# Patient Record
Sex: MALE | Race: BLACK OR AFRICAN AMERICAN | Employment: FULL TIME | URBAN - METROPOLITAN AREA
[De-identification: names, ages, dates, MRNs, and addresses within clinical notes are randomized per-mention and may not be internally consistent; named-entity substitution may affect disease eponyms.]

---

## 2022-12-14 ENCOUNTER — OFFICE VISIT (OUTPATIENT)
Dept: FAMILY MEDICINE CLINIC | Age: 37
End: 2022-12-14

## 2022-12-14 ENCOUNTER — HOSPITAL ENCOUNTER (OUTPATIENT)
Dept: LAB | Age: 37
Discharge: HOME | End: 2022-12-14

## 2022-12-14 ENCOUNTER — TELEPHONE (OUTPATIENT)
Dept: FAMILY MEDICINE CLINIC | Age: 37
End: 2022-12-14

## 2022-12-14 VITALS
TEMPERATURE: 98.1 F | DIASTOLIC BLOOD PRESSURE: 88 MMHG | HEART RATE: 66 BPM | SYSTOLIC BLOOD PRESSURE: 129 MMHG | WEIGHT: 204.6 LBS | OXYGEN SATURATION: 100 % | RESPIRATION RATE: 17 BRPM | HEIGHT: 71 IN | BODY MASS INDEX: 28.64 KG/M2

## 2022-12-14 DIAGNOSIS — M79.2 RADICULAR PAIN IN RIGHT ARM: ICD-10-CM

## 2022-12-14 DIAGNOSIS — M79.2 RADICULAR PAIN IN LEFT ARM: ICD-10-CM

## 2022-12-14 DIAGNOSIS — G62.9 POLYNEUROPATHY: ICD-10-CM

## 2022-12-14 DIAGNOSIS — Z11.59 NEED FOR HEPATITIS C SCREENING TEST: ICD-10-CM

## 2022-12-14 DIAGNOSIS — Z00.00 ENCOUNTER FOR ROUTINE HISTORY AND PHYSICAL EXAM FOR MALE: Primary | ICD-10-CM

## 2022-12-14 DIAGNOSIS — M25.50 CHRONIC PAIN OF MULTIPLE JOINTS: ICD-10-CM

## 2022-12-14 DIAGNOSIS — G89.29 CHRONIC PAIN OF MULTIPLE JOINTS: ICD-10-CM

## 2022-12-14 DIAGNOSIS — Z23 ENCOUNTER FOR IMMUNIZATION: ICD-10-CM

## 2022-12-14 DIAGNOSIS — Z00.00 ENCOUNTER FOR ROUTINE HISTORY AND PHYSICAL EXAM FOR MALE: ICD-10-CM

## 2022-12-14 LAB
ALBUMIN SERPL-MCNC: 4.5 G/DL (ref 3.4–5)
ALBUMIN/GLOB SERPL: 1.1 {RATIO} (ref 0.8–1.7)
ALP SERPL-CCNC: 68 U/L (ref 45–117)
ALT SERPL-CCNC: 41 U/L (ref 16–61)
ANION GAP SERPL CALC-SCNC: 5 MMOL/L (ref 3–18)
APPEARANCE UR: CLEAR
AST SERPL-CCNC: 30 U/L (ref 10–38)
BACTERIA URNS QL MICRO: ABNORMAL /HPF
BASOPHILS # BLD: 0.1 K/UL (ref 0–0.1)
BASOPHILS NFR BLD: 1 % (ref 0–2)
BILIRUB SERPL-MCNC: 0.8 MG/DL (ref 0.2–1)
BILIRUB UR QL: NEGATIVE
BUN SERPL-MCNC: 13 MG/DL (ref 7–18)
BUN/CREAT SERPL: 13 (ref 12–20)
CALCIUM SERPL-MCNC: 10.1 MG/DL (ref 8.5–10.1)
CHLORIDE SERPL-SCNC: 107 MMOL/L (ref 100–111)
CHOLEST SERPL-MCNC: 187 MG/DL
CO2 SERPL-SCNC: 27 MMOL/L (ref 21–32)
COLOR UR: ABNORMAL
CREAT SERPL-MCNC: 1 MG/DL (ref 0.6–1.3)
DIFFERENTIAL METHOD BLD: NORMAL
EOSINOPHIL # BLD: 0.1 K/UL (ref 0–0.4)
EOSINOPHIL NFR BLD: 2 % (ref 0–5)
EPITH CASTS URNS QL MICRO: ABNORMAL /LPF (ref 0–5)
ERYTHROCYTE [DISTWIDTH] IN BLOOD BY AUTOMATED COUNT: 12.9 % (ref 11.6–14.5)
EST. AVERAGE GLUCOSE BLD GHB EST-MCNC: 117 MG/DL
GLOBULIN SER CALC-MCNC: 4.1 G/DL (ref 2–4)
GLUCOSE SERPL-MCNC: 84 MG/DL (ref 74–99)
GLUCOSE UR STRIP.AUTO-MCNC: NEGATIVE MG/DL
HBA1C MFR BLD: 5.7 % (ref 4.2–5.6)
HCT VFR BLD AUTO: 44.6 % (ref 36–48)
HDLC SERPL-MCNC: 60 MG/DL (ref 40–60)
HDLC SERPL: 3.1 {RATIO} (ref 0–5)
HGB BLD-MCNC: 14.7 G/DL (ref 13–16)
HGB UR QL STRIP: NEGATIVE
IMM GRANULOCYTES # BLD AUTO: 0 K/UL (ref 0–0.04)
IMM GRANULOCYTES NFR BLD AUTO: 0 % (ref 0–0.5)
KETONES UR QL STRIP.AUTO: ABNORMAL MG/DL
LDLC SERPL CALC-MCNC: 118.2 MG/DL (ref 0–100)
LEUKOCYTE ESTERASE UR QL STRIP.AUTO: NEGATIVE
LIPID PROFILE,FLP: ABNORMAL
LYMPHOCYTES # BLD: 1.8 K/UL (ref 0.9–3.6)
LYMPHOCYTES NFR BLD: 32 % (ref 21–52)
MCH RBC QN AUTO: 31.4 PG (ref 24–34)
MCHC RBC AUTO-ENTMCNC: 33 G/DL (ref 31–37)
MCV RBC AUTO: 95.3 FL (ref 78–100)
MONOCYTES # BLD: 0.4 K/UL (ref 0.05–1.2)
MONOCYTES NFR BLD: 8 % (ref 3–10)
NEUTS SEG # BLD: 3.1 K/UL (ref 1.8–8)
NEUTS SEG NFR BLD: 56 % (ref 40–73)
NITRITE UR QL STRIP.AUTO: NEGATIVE
NRBC # BLD: 0 K/UL (ref 0–0.01)
NRBC BLD-RTO: 0 PER 100 WBC
PH UR STRIP: 7.5 [PH] (ref 5–8)
PLATELET # BLD AUTO: 281 K/UL (ref 135–420)
PMV BLD AUTO: 11.5 FL (ref 9.2–11.8)
POTASSIUM SERPL-SCNC: 4.5 MMOL/L (ref 3.5–5.5)
PROT SERPL-MCNC: 8.6 G/DL (ref 6.4–8.2)
PROT UR STRIP-MCNC: ABNORMAL MG/DL
RBC # BLD AUTO: 4.68 M/UL (ref 4.35–5.65)
RBC #/AREA URNS HPF: ABNORMAL /HPF (ref 0–5)
SODIUM SERPL-SCNC: 139 MMOL/L (ref 136–145)
SP GR UR REFRACTOMETRY: 1.03 (ref 1–1.03)
TRIGL SERPL-MCNC: 44 MG/DL (ref ?–150)
TSH SERPL DL<=0.05 MIU/L-ACNC: 1.57 UIU/ML (ref 0.36–3.74)
UROBILINOGEN UR QL STRIP.AUTO: 1 EU/DL (ref 0.2–1)
VLDLC SERPL CALC-MCNC: 8.8 MG/DL
WBC # BLD AUTO: 5.5 K/UL (ref 4.6–13.2)
WBC URNS QL MICRO: NEGATIVE /HPF (ref 0–4)

## 2022-12-14 PROCEDURE — 36415 COLL VENOUS BLD VENIPUNCTURE: CPT

## 2022-12-14 PROCEDURE — 85025 COMPLETE CBC W/AUTO DIFF WBC: CPT

## 2022-12-14 PROCEDURE — 81001 URINALYSIS AUTO W/SCOPE: CPT

## 2022-12-14 PROCEDURE — 84443 ASSAY THYROID STIM HORMONE: CPT

## 2022-12-14 PROCEDURE — 80061 LIPID PANEL: CPT

## 2022-12-14 PROCEDURE — 99385 PREV VISIT NEW AGE 18-39: CPT | Performed by: NURSE PRACTITIONER

## 2022-12-14 PROCEDURE — 83036 HEMOGLOBIN GLYCOSYLATED A1C: CPT

## 2022-12-14 PROCEDURE — 80053 COMPREHEN METABOLIC PANEL: CPT
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
## 2022-12-16 DIAGNOSIS — E78.00 ELEVATED LDL CHOLESTEROL LEVEL: ICD-10-CM

## 2022-12-16 DIAGNOSIS — R73.03 PREDIABETES: Primary | ICD-10-CM

## 2022-12-16 NOTE — PROGRESS NOTES
Labs show A1C of 5.7 with trace ketones and protein in the urine- this is prediabetes. Recommended at this time diet and exercise management- limit simple carbohydrates, sugary products and drinks, limit etoh intake, limit potatoes, pastas. Water!!!!    Cholesterol shows LDL at 118, HDL at 60 so at this time recommended mediterrean diet, limit meat, lean cuts only. High fiber in the diet    Thyroid is good, no infectious or anemic processes noted. Lets follow up in 6 months to repeat labs for prediabetes and cholesterol. Please schedule with the office for follow up with me and labs to be compeleted 1 week prior fasting!

## 2022-12-21 ENCOUNTER — TELEPHONE (OUTPATIENT)
Dept: FAMILY MEDICINE CLINIC | Age: 37
End: 2022-12-21

## 2022-12-21 NOTE — TELEPHONE ENCOUNTER
----- Message from Melisa Mcgregor NP sent at 12/16/2022  8:27 AM EST -----  Labs show A1C of 5.7 with trace ketones and protein in the urine- this is prediabetes. Recommended at this time diet and exercise management- limit simple carbohydrates, sugary products and drinks, limit etoh intake, limit potatoes, pastas. Water!!!!    Cholesterol shows LDL at 118, HDL at 60 so at this time recommended mediterrean diet, limit meat, lean cuts only. High fiber in the diet    Thyroid is good, no infectious or anemic processes noted. Lets follow up in 6 months to repeat labs for prediabetes and cholesterol. Please schedule with the office for follow up with me and labs to be compeleted 1 week prior fasting! 18-Aug-2018 09:29

## 2022-12-21 NOTE — TELEPHONE ENCOUNTER
Spoke w patient regarding lab results. Verbalized a understanding. Also, informed him to give us a call in January for labs.

## 2022-12-21 NOTE — TELEPHONE ENCOUNTER
Reached out to go over lab results. There was no answer. Left vm to give our office a call.      Bobby Marcano

## 2023-02-02 ENCOUNTER — OFFICE VISIT (OUTPATIENT)
Dept: ORTHOPEDIC SURGERY | Age: 38
End: 2023-02-02

## 2023-02-02 VITALS
HEIGHT: 68 IN | OXYGEN SATURATION: 95 % | HEART RATE: 87 BPM | TEMPERATURE: 98.2 F | WEIGHT: 192 LBS | BODY MASS INDEX: 29.1 KG/M2

## 2023-02-02 DIAGNOSIS — M47.816 LUMBAR SPONDYLOSIS: ICD-10-CM

## 2023-02-02 DIAGNOSIS — M54.50 LUMBAR PAIN: ICD-10-CM

## 2023-02-02 DIAGNOSIS — M47.816 FACET ARTHROPATHY, LUMBAR: ICD-10-CM

## 2023-02-02 DIAGNOSIS — M79.672 LEFT FOOT PAIN: Primary | ICD-10-CM

## 2023-02-02 DIAGNOSIS — M46.1 SACROILIITIS (HCC): ICD-10-CM

## 2023-02-02 RX ORDER — DICLOFENAC SODIUM 75 MG/1
75 TABLET, DELAYED RELEASE ORAL 2 TIMES DAILY WITH MEALS
Qty: 60 TABLET | Refills: 1 | Status: SHIPPED | OUTPATIENT
Start: 2023-02-02

## 2023-02-02 NOTE — PROGRESS NOTES
Patient: Deanna Mayberry                MRN: [id number]       SSN: [id number]  YOB: 1985        AGE: 44 y.o. SEX: male          PCP: Lennie Edward NP  02/02/23    Chief Complaint   Patient presents with    Back Pain     Lower back         HISTORY:  Deanna Mayberry is a 44 y.o. male seen for Chronic lumbar pain dating over 10 years with no bowel, nor bladder in continence, no radicular pain LE. No injuries, but playing sports in high school   patient feels he may have injured but no treatment obtained. Pain on and off with right low back and no buttocks pain. Tyl and OTC meds are helpful when the condition is occurring. Patient reports often he will experience this type of lower back pain twice a month with resolution taking from 1 to a couple days. Lab Results   Component Value Date/Time    Hemoglobin A1c 5.7 (H) 12/14/2022 11:11 AM     Weight Metrics 2/2/2023 12/14/2022 12/26/2018 12/5/2018 10/3/2016   Weight 192 lb 204 lb 9.6 oz 199 lb 199 lb 178 lb   BMI 29.19 kg/m2 28.54 kg/m2 27.75 kg/m2 27.75 kg/m2 24.83 kg/m2            Problem List Items Addressed This Visit    None  Visit Diagnoses       Left foot pain    -  Primary    Relevant Orders    REFERRAL TO ORTHOPEDIC SURGERY    Lumbar pain        Relevant Medications    diclofenac EC (VOLTAREN) 75 mg EC tablet    Other Relevant Orders    AMB POC XRAY, SPINE, LUMBOSACRAL; 4+ (Completed)    REFERRAL TO PHYSICAL THERAPY    REFERRAL TO ORTHOPEDIC SURGERY    Sacroiliitis (HCC)        Relevant Orders    REFERRAL TO PHYSICAL THERAPY    REFERRAL TO ORTHOPEDIC SURGERY    Lumbar spondylosis        Relevant Medications    diclofenac EC (VOLTAREN) 75 mg EC tablet    Facet arthropathy, lumbar        Relevant Medications    diclofenac EC (VOLTAREN) 75 mg EC tablet            PAST MEDICAL HISTORY: History reviewed. No pertinent past medical history.     PAST SURGICAL HISTORY:   Past Surgical History:   Procedure Laterality Date    HX UROLOGICAL  12/18/2018    CIRCUMCISION, ADULT- Dr. Fe Rubio: No Known Allergies     CURRENT MEDICATIONS:  A list of medications prior to the time of admission include:  Prior to Admission medications    Medication Sig Start Date End Date Taking? Authorizing Provider   diclofenac EC (VOLTAREN) 75 mg EC tablet Take 1 Tablet by mouth two (2) times daily (with meals). 2/2/23  Yes Jose Torrez PA-C   cyanocobalamin, vitamin B-12, (VITAMIN B12 PO) Take  by mouth daily. Provider, Historical       FAMILY HISTORY:   Family History   Problem Relation Age of Onset    Stroke Mother     Alcohol abuse Mother         COD    No Known Problems Father     No Known Problems Sister        SOCIAL HISTORY:   Social History     Socioeconomic History    Marital status: SINGLE   Tobacco Use    Smoking status: Never    Smokeless tobacco: Never   Vaping Use    Vaping Use: Never used   Substance and Sexual Activity    Alcohol use: Yes     Comment: occasionally    Drug use: No    Sexual activity: Yes     Partners: Female     Social Determinants of Health     Financial Resource Strain: Low Risk     Difficulty of Paying Living Expenses: Not hard at all   Food Insecurity: No Food Insecurity    Worried About Running Out of Food in the Last Year: Never true    Ran Out of Food in the Last Year: Never true       ROS:No CP, No SOB, No fever/chills nor night sweats. No headaches, vision abnormalities to include double and or loss of vision. No dizziness. No hearing abnormalities. No Chest Pain nor Shortness of breath. Pt denies h/o spinal surgery, injections, or PT/chiropractor. Patient has attempted self treatment with less than adequate relief on oral and topical analgesic / anti inflammatory medications . Pt denies change in bowel or bladder habits. No saddle paresthesia / anesthesia.  Pt denies fever, unplanned weight loss / weight gains, and no skin changes. Musculoskeletal pain per HPI. Pain is exacerbated positionally. PHYSICAL EXAM:    Visit Vitals  Pulse 87   Temp 98.2 °F (36.8 °C) (Temporal)   Ht 5' 8\" (1.727 m)   Wt 192 lb (87.1 kg)   SpO2 95%   BMI 29.19 kg/m²       Constitutional: Appears well-developed and well-nourished. No distress. Sitting comfortably in the exam room, interacting with conversation with pleasant affect. Gait appears steady and patient exhibits no evidence of ataxia. Patient is able to ambulate with caution. No focal neurological deficit noted. No facial droop, slurred speech, or evidence of altered mentation noted on exam.   Skin: Skin over the head, neck, bilateral limbs, and trunk is warm and dry. No rash or erythema noted. Cranial Nerves II-XII grossly intact  HENT: NC/AT. Normal symmetry, bulk and tone of facial and neck musculature. Trachea midline. No discernible thyromegaly or masses. No involuntary movements. Lymphatic: No preauricular, submandibuar, anterior or posterior cervical lymphadenopathy. Psychiatric: The patient is awake, alert, and oriented to person, place and time. Behavior is normal. Thought content normal.   Cardiovascular: No clubbing, cyanosis. No edema bilateral lower extremities. Pulmonary: No tripoding nor accessory muscle recruitment. Breathing normally, no distress, no audible wheezing. Distal cap refill intact at 2/2 Damon UE / LE. Neuro intact Damon UE/LE to noxious stimuli        Ortho Specific exam:      Patient in shorts examined supine on the bed noted to have a negative straight leg raise symmetrically and a negative contralateral figure 4 sign also symmetrically. Sitting at bedside both knees flexed at 90 degrees motor strength noted quad hamstrings abductors abductors 5/5 against resistance. DTRs tested both knees flexed at 90 degrees 2+/2 patella and 2/2 for Achilles symmetrically. Negative Babinski symmetrically.     Patient standing at bedside both feet together right hand resting on the table patient forward flexed at the waist touching fingertips to just below the knees without pain or guarding. Returning to a neutral standing position patient has no pain or guarding. Again standing neutral flexing to 20 degrees midline lumbar spine examined to reveal no step-off lesions or masses palpable. Left para lumbar musculature nontender with no cords or spasms noted. Right paralumbar musculature reveals a lipomatous mass associated with the SI joint and on deep palpation SI discomfort. Soft tissue lipomatous mass mobile by half centimeter in all directions. Generally though the mass was nontender. Hyperextension of the back at 10 degrees without pain or guarding. Lateral bending to the left and the right both noted at 15 to 20 degrees respectively without pain or guarding. Lateral rotation to the left 10 degrees to the right 50 degrees without pain or guarding. [facility] lateral lumbar spine reveals early degenerative changes noted L4-L5 L5-S1. There is also facet arthropathy early L4-L5 L5-S1. No evidence of spondylolisthesis. No fracture deformities. No evidence of lytic or blastic lesions. No soft tissue ossifications. IMPRESSION:      ICD-10-CM ICD-9-CM    1. Left foot pain  M79.672 729.5 REFERRAL TO ORTHOPEDIC SURGERY      2. Lumbar pain  M54.50 724.2 AMB POC XRAY, SPINE, LUMBOSACRAL; 4+      REFERRAL TO PHYSICAL THERAPY      REFERRAL TO ORTHOPEDIC SURGERY      3. Sacroiliitis (HCC)  M46.1 720.2 REFERRAL TO PHYSICAL THERAPY      REFERRAL TO ORTHOPEDIC SURGERY      4. Lumbar spondylosis  M47.816 721.3       5. Facet arthropathy, lumbar  M47.816 721.3       6. Obesity BMI 29.19    PLAN: Today we discussed options for care to include but not limited to a brief course of physical therapy to demonstrate for home exercises 2 times with HEP.   Also as anti-inflammatory for symptoms when necessary I recommended Voltaren enteric-coated tablet 75 mg twice daily. Prescription was called to the pharmacy on file. Patient to follow-up with us on a as needed basis. Should he develop any bowel or bladder incontinence saddle paresthesia or anesthesia or have any change to worsening of his symptoms he is to call this office immediately for reevaluation if after hours proceed to the emergency room. Consideration for MRI of the lumbar spine if those symptoms as mentioned previous occur. Today all of his questions answered to his satisfaction. Additionally today we discussed the diagnosis of obesity and the importance of weight management for both cardiovascular health. Weight loss strategies discussed below. Patient recommended decreasing carbohydrates, sugars and calories for weight control to improve cardiovascular health and decrease axial loading forces on hips knees and ankles. Patient recommended a formal dietary consult which they will consider and return a call to our office. In light of the patient's osteoarthritic findings I am making a recommendation for aerobic exercise to include but not limited to stationary bicycle, elliptical, therapeutic walking with good shoes and or swimming. Patient should avoid any running or jumping. If using the treadmill then recommendation for no elevation and no running or jogging. Will discussed supportive treatment, NSAIDS, RICE and orthopedic follow-up. Discussed treatment plan, return precautions, symptomatic relief, and expected time to improvement. All questions answered. Patient is stable for discharge and outpatient management. Medication use, risk/benefit, side effects, and precautions discussed. Care plan outlined and precautions discussed. Results were reviewed with the patient. All medications were reviewed with the patient. All of pt's questions and concerns were addressed.   Alarm symptoms and return precautions associated with chief complaint and evaluation were reviewed with the patient in detail. The patient demonstrated adequate understanding. The patient expresses willing compliance with the treatment plan. Special note: Medication management discussed in detail all patient's questions answered to their satisfaction. Patient provided a reminder for a \"due or due soon\" health maintenance. I have asked the patient to schedule an appointment with their primary care provider for follow-up on general health maintenance concerns. Today all the patient's questions were answered to their satisfaction. Copies of x-rays reviewed if obtained this visit, and provided to patient. Dictation disclaimer:  Please note that this dictation was completed with Motivano, the computer voice recognition software. Quite often unanticipated grammatical, syntax, homophones, and other interpretive errors are inadvertently transcribed by the computer software. Please disregard these errors. Please excuse any errors that have escaped final proofreading. Demi DONOHUE, APC, MPAS, PA-C  [facility]

## 2023-02-04 DIAGNOSIS — E78.00 ELEVATED LDL CHOLESTEROL LEVEL: ICD-10-CM

## 2023-02-04 DIAGNOSIS — R73.03 PREDIABETES: Primary | ICD-10-CM

## 2023-02-05 DIAGNOSIS — E78.00 ELEVATED LDL CHOLESTEROL LEVEL: ICD-10-CM

## 2023-02-05 DIAGNOSIS — R73.03 PREDIABETES: Primary | ICD-10-CM

## 2023-02-09 DIAGNOSIS — E78.00 ELEVATED LDL CHOLESTEROL LEVEL: ICD-10-CM

## 2023-02-09 DIAGNOSIS — R73.03 PREDIABETES: Primary | ICD-10-CM

## 2023-02-14 DIAGNOSIS — M79.672 LEFT FOOT PAIN: ICD-10-CM

## 2023-02-14 DIAGNOSIS — M54.50 LUMBAR PAIN: Primary | ICD-10-CM

## 2023-02-14 DIAGNOSIS — M46.1 SACROILIITIS (HCC): ICD-10-CM

## 2023-12-15 ENCOUNTER — OFFICE VISIT (OUTPATIENT)
Age: 38
End: 2023-12-15

## 2023-12-15 VITALS
TEMPERATURE: 100.6 F | BODY MASS INDEX: 32.55 KG/M2 | HEART RATE: 54 BPM | SYSTOLIC BLOOD PRESSURE: 134 MMHG | WEIGHT: 214.8 LBS | DIASTOLIC BLOOD PRESSURE: 91 MMHG | HEIGHT: 68 IN | RESPIRATION RATE: 16 BRPM | OXYGEN SATURATION: 97 %

## 2023-12-15 DIAGNOSIS — E78.00 ELEVATED LDL CHOLESTEROL LEVEL: ICD-10-CM

## 2023-12-15 DIAGNOSIS — Z00.00 ANNUAL PHYSICAL EXAM: ICD-10-CM

## 2023-12-15 DIAGNOSIS — B07.0 PLANTAR WART: ICD-10-CM

## 2023-12-15 DIAGNOSIS — Z11.59 ENCOUNTER FOR HEPATITIS C SCREENING TEST FOR LOW RISK PATIENT: Primary | ICD-10-CM

## 2023-12-15 DIAGNOSIS — Z11.3 SCREENING EXAMINATION FOR STD (SEXUALLY TRANSMITTED DISEASE): ICD-10-CM

## 2023-12-15 DIAGNOSIS — R73.03 PREDIABETES: ICD-10-CM

## 2023-12-15 DIAGNOSIS — Z23 IMMUNIZATION DUE: ICD-10-CM

## 2023-12-15 PROCEDURE — 90715 TDAP VACCINE 7 YRS/> IM: CPT | Performed by: NURSE PRACTITIONER

## 2023-12-15 PROCEDURE — PBSHW TDAP, BOOSTRIX, (AGE 10 YRS+), IM: Performed by: NURSE PRACTITIONER

## 2023-12-15 PROCEDURE — 99395 PREV VISIT EST AGE 18-39: CPT | Performed by: NURSE PRACTITIONER

## 2023-12-15 NOTE — PATIENT INSTRUCTIONS
of    Website: [email or web address].info/     [address] ([facility])  What they offer: Provide support to [place] residents who are experiencing crises by assessing their needs, providing information and resources, and directly contributing financially  Website: [place]  [place] residents can make an appointment by calling [facility] at [phone] or emailing [email or web address]        Medication  Good Rx   What they offer: Good Rx tracks prescription drug prices and provides free drug coupons for discounts on medications. Website: VipAnalysisMedAlliance/     NeedyMeds   What they offer: Needy[employer]eds offers free information on medications and healthcare cost savings programs including prescription assistance programs, coupons, and discount programs. Website: PaymentBack.Salezeo org/   Helpline: 715.967.5151     RX Assist   What they offer: Information about free and low-cost medicine programs. Website: https://Mapidy/     [facility] $4 Prescription Program   What they offer: Prescription Program includes up to a 30-day supply for $4 and a 90-day supply for $10 of some covered generic drugs at commonly prescribed dosages   Website: Dial2-Observevinay.de      The Co-Pay Relief Program  What they offer: The Co-Pay Relief Program provides you with direct prescription co-payment assistance, if you are an insured [place] citizen and financially and medically qualify, including Medicare Part D beneficiaries who require assistance with their prescription drug co-payments. Phone toll-free: 5-809.642.2485  Website: www.Soluto. org    The Partnership for Prescription Assistance   What they offer: The Partnership for Prescription Assistance can help you if you lack Prescription coverage to get the medicines you need through the public or private program that is right for you.   Phone toll-free:

## 2023-12-15 NOTE — PROGRESS NOTES
Azeb Stanley (:  1985) is a 43 y.o. male, here for evaluation of the following medical concerns:  Chief Complaint   Patient presents with    Annual Exam         ASSESSMENT/PLAN:  1. Encounter for hepatitis C screening test for low risk patient    - Hepatitis C Antibody; Future    2. Annual physical exam    - CBC with Auto Differential; Future  - Comprehensive Metabolic Panel; Future  - Hemoglobin A1C; Future  - Lipid Panel; Future  - Urinalysis with Microscopic; Future  - Hepatitis C Antibody; Future  - Hepatitis Be antigen; Future  - HIV 1/2 Ag/Ab, 4TH Generation,W Rflx Confirm; Future  - T. pallidum Ab; Future  - Chlamydia, Gonorrhea, Trichomoniasis; Future    3. Immunization due    - Tdap, BOOSTRIX, (age 8 yrs+), IM    4. Screening examination for STD (sexually transmitted disease)    - Hepatitis Be antigen; Future  - HIV 1/2 Ag/Ab, 4TH Generation,W Rflx Confirm; Future  - T. pallidum Ab; Future  - Chlamydia, Gonorrhea, Trichomoniasis; Future    5. Prediabetes    - Comprehensive Metabolic Panel; Future  - Hemoglobin A1C; Future    6. Elevated LDL cholesterol level    - Comprehensive Metabolic Panel; Future  - Hemoglobin A1C; Future  - Lipid Panel; Future    7. Plantar wart    - External Referral To Dermatology      Return for [id number], ANNUAL PHYSICAL. HPI  Here for routine physical. Needs tdap, declines influenza. He is a . Had a baby he is 5 months its a boy, Monster. Has plantar wart- completed freeze in the past, he repeated freeze this time and no resolve. Review of Systems   Constitutional:  Negative for diaphoresis and fatigue. Respiratory:  Negative for cough, chest tightness and shortness of breath. Cardiovascular:  Negative for chest pain, palpitations and leg swelling. Gastrointestinal:  Negative for abdominal pain, nausea and vomiting. Neurological:  Negative for dizziness, weakness and headaches.        Prior to Visit Medications    Medication Sig

## 2023-12-15 NOTE — PROGRESS NOTES
1. \"Have you been to the ER, urgent care clinic since your last visit? Hospitalized since your last visit? \" Yes When: 6/23 Where: [facility] Reason for visit: insect bite    2. \"Have you seen or consulted any other health care providers outside of the [address] [place] [address] since your last visit? \" No